# Patient Record
Sex: MALE | Race: BLACK OR AFRICAN AMERICAN | NOT HISPANIC OR LATINO | Employment: FULL TIME | URBAN - METROPOLITAN AREA
[De-identification: names, ages, dates, MRNs, and addresses within clinical notes are randomized per-mention and may not be internally consistent; named-entity substitution may affect disease eponyms.]

---

## 2024-07-20 ENCOUNTER — HOSPITAL ENCOUNTER (EMERGENCY)
Facility: CLINIC | Age: 24
Discharge: HOME | End: 2024-07-20
Attending: EMERGENCY MEDICINE | Admitting: EMERGENCY MEDICINE

## 2024-07-20 VITALS
OXYGEN SATURATION: 98 % | RESPIRATION RATE: 20 BRPM | SYSTOLIC BLOOD PRESSURE: 119 MMHG | WEIGHT: 139.33 LBS | BODY MASS INDEX: 18.87 KG/M2 | HEIGHT: 72 IN | TEMPERATURE: 98.5 F | HEART RATE: 73 BPM | DIASTOLIC BLOOD PRESSURE: 79 MMHG

## 2024-07-20 DIAGNOSIS — R22.0 FACIAL SWELLING: ICD-10-CM

## 2024-07-20 PROCEDURE — 99283 EMERGENCY DEPT VISIT LOW MDM: CPT

## 2024-07-20 RX ORDER — PREDNISONE 20 MG/1
TABLET ORAL
Qty: 10 TABLET | Refills: 0 | Status: SHIPPED | OUTPATIENT
Start: 2024-07-20

## 2024-07-20 RX ORDER — BENZOCAINE/MENTHOL 6 MG-10 MG
LOZENGE MUCOUS MEMBRANE 2 TIMES DAILY
Qty: 30 G | Refills: 0 | Status: SHIPPED | OUTPATIENT
Start: 2024-07-20

## 2024-07-20 ASSESSMENT — COLUMBIA-SUICIDE SEVERITY RATING SCALE - C-SSRS
6. HAVE YOU EVER DONE ANYTHING, STARTED TO DO ANYTHING, OR PREPARED TO DO ANYTHING TO END YOUR LIFE?: NO
2. HAVE YOU ACTUALLY HAD ANY THOUGHTS OF KILLING YOURSELF IN THE PAST MONTH?: NO
1. IN THE PAST MONTH, HAVE YOU WISHED YOU WERE DEAD OR WISHED YOU COULD GO TO SLEEP AND NOT WAKE UP?: YES

## 2024-07-20 ASSESSMENT — ACTIVITIES OF DAILY LIVING (ADL)
ADLS_ACUITY_SCORE: 33
ADLS_ACUITY_SCORE: 33

## 2024-07-20 NOTE — ED PROVIDER NOTES
Emergency Department Note      History of Present Illness     Chief Complaint   Allergic Reaction      HPI   Fermin Matute is a 24 year old male presents emergency ferment with swelling to his eyelids and face.  Symptoms been ongoing and waxing waning for the last week.  He notes some puffy eyes, lips and a erythematous rash on his face.  Will wax and wane.  No history of allergic reaction.  He denies any new facial soaps, shampoos, detergents or lotions.  He is only been using some Vaseline.  Denies any recent environmental exposures.  No history of seasonal allergies.  Denies any throat swelling, difficulties swallowing or respiratory discomfort.  Denies any swelling in his arms or legs.  Denies any distal edema.  No significant lesions or rashes on hands or feet.  No fevers, chills, nausea or vomiting.  No abdominal pain.    Past Medical History     Medications   hydrocortisone (CORTAID) 1 % external cream  predniSONE (DELTASONE) 20 MG tablet  albuterol (PROAIR HFA, PROVENTIL HFA, VENTOLIN HFA) 108 (90 BASE) MCG/ACT inhaler  ibuprofen (ADVIL,MOTRIN) 200 MG tablet  NO ACTIVE MEDICATIONS        Surgical History   No past surgical history on file.    Physical Exam     Patient Vitals for the past 24 hrs:   BP Temp Temp src Pulse Resp SpO2 Height Weight   07/20/24 0042 119/79 98.5  F (36.9  C) Axillary 73 20 98 % 1.829 m (6') 63.2 kg (139 lb 5.3 oz)     Physical Exam  General: Patient is awake, alert  Head: The scalp, face, and head appear normal  Eyes: Bilateral eyelid edema, no conjunctivitis.  Extraocular motions are intact.  ENT: External acoustic canals are normal. The oropharynx is normal without erythema. Uvula is in the midline.  No tongue swelling noted  Neck: Normal range of motion.   CV: Regular rate and rhythm.   Resp: Lungs are clear without wheezes or rales. No respiratory distress.   GI: Abdomen is soft, no rigidity, guarding, or rebound. No distension. No tenderness to palpation in any quadrant.     MS: Normal tone. Joints grossly normal without effusions. No asymmetric leg swelling, calf or thigh tenderness.    Skin: Acne present, faint erythematous lesion over the face.  Mild swelling to the lips.. Normal capillary refill noted  Neuro: Speech is normal and fluent. Face is symmetric. Moving all extremities.   Psych:  Normal affect.  Appropriate interactions.     Medical Decision Making / Diagnosis       MDM   Who presents to the emergency department with bilateral eyelid swelling, mild swelling and erythema to the face and lips.  No clear environmental exposures.  Possible allergic reaction.  Will start on steroids and antihistamines.  Will have her follow-up with allergy specialist.    Disposition   The patient was discharged.     Diagnosis     ICD-10-CM    1. Facial swelling  R22.0 Adult Allergy/Asthma  Referral           Discharge Medications   Discharge Medication List as of 7/20/2024  3:29 AM        START taking these medications    Details   hydrocortisone (CORTAID) 1 % external cream Apply topically 2 times dailyDisp-30 g, R-0Local Print      predniSONE (DELTASONE) 20 MG tablet Take two tablets (= 40mg) each day for 5 (five) days, Disp-10 tablet, R-0, Local Print           MD Anat Carlson, Chandana Givens MD  07/20/24 0507

## 2024-07-20 NOTE — ED TRIAGE NOTES
Pt presented from ED for allergic rxn. Pt reported he has rash early July, gotten worse today, with puffy eye, and lip, rash all over the face, around lunch time, pt noted lump at the back of throat. Took benadryl once at home. Denied voice quality change. ABC intact      Triage Assessment (Adult)       Row Name 07/20/24 0043          Triage Assessment    Airway WDL WDL        Respiratory WDL    Respiratory WDL WDL        Skin Circulation/Temperature WDL    Skin Circulation/Temperature WDL WDL        Cardiac WDL    Cardiac WDL WDL        Peripheral/Neurovascular WDL    Peripheral Neurovascular WDL WDL        Cognitive/Neuro/Behavioral WDL    Cognitive/Neuro/Behavioral WDL WDL